# Patient Record
Sex: FEMALE | Race: WHITE | URBAN - METROPOLITAN AREA
[De-identification: names, ages, dates, MRNs, and addresses within clinical notes are randomized per-mention and may not be internally consistent; named-entity substitution may affect disease eponyms.]

---

## 2020-05-08 ENCOUNTER — APPOINTMENT (OUTPATIENT)
Dept: URBAN - METROPOLITAN AREA CLINIC 186 | Age: 70
Setting detail: DERMATOLOGY
End: 2020-05-08

## 2020-05-08 VITALS — TEMPERATURE: 96.9 F

## 2020-05-08 DIAGNOSIS — R21 RASH AND OTHER NONSPECIFIC SKIN ERUPTION: ICD-10-CM

## 2020-05-08 DIAGNOSIS — T49.0X5 ADVERSE EFFECT OF LOCAL ANTIFUNGAL, ANTI-INFECTIVE AND ANTI-INFLAMMATORY DRUGS: ICD-10-CM

## 2020-05-08 PROBLEM — T49.0X5A ADVERSE EFFECT OF LOCAL ANTIFUNGAL, ANTI-INFECTIVE AND ANTI-INFLAMMATORY DRUGS, INITIAL ENCOUNTER: Status: ACTIVE | Noted: 2020-05-08

## 2020-05-08 PROCEDURE — 11105 PUNCH BX SKIN EA SEP/ADDL: CPT

## 2020-05-08 PROCEDURE — OTHER BIOPSY BY PUNCH METHOD: OTHER

## 2020-05-08 PROCEDURE — OTHER COUNSELING: TOPICAL STEROIDS: OTHER

## 2020-05-08 PROCEDURE — OTHER ADDITIONAL NOTES: OTHER

## 2020-05-08 PROCEDURE — 11104 PUNCH BX SKIN SINGLE LESION: CPT

## 2020-05-08 PROCEDURE — OTHER COUNSELING: OTHER

## 2020-05-08 PROCEDURE — OTHER DIAGNOSIS COMMENT: OTHER

## 2020-05-08 PROCEDURE — OTHER PRESCRIPTION: OTHER

## 2020-05-08 PROCEDURE — OTHER ORDER TESTS: OTHER

## 2020-05-08 PROCEDURE — 99202 OFFICE O/P NEW SF 15 MIN: CPT | Mod: 25

## 2020-05-08 ASSESSMENT — LOCATION SIMPLE DESCRIPTION DERM
LOCATION SIMPLE: LEFT POSTERIOR UPPER ARM
LOCATION SIMPLE: LEFT THIGH
LOCATION SIMPLE: RIGHT FOREARM
LOCATION SIMPLE: LEFT FOREARM

## 2020-05-08 ASSESSMENT — LOCATION ZONE DERM
LOCATION ZONE: ARM
LOCATION ZONE: LEG

## 2020-05-08 ASSESSMENT — PAIN INTENSITY VAS: HOW INTENSE IS YOUR PAIN 0 BEING NO PAIN, 10 BEING THE MOST SEVERE PAIN POSSIBLE?: NO PAIN

## 2020-05-08 ASSESSMENT — LOCATION DETAILED DESCRIPTION DERM
LOCATION DETAILED: LEFT ANTERIOR PROXIMAL THIGH
LOCATION DETAILED: LEFT PROXIMAL DORSAL FOREARM
LOCATION DETAILED: RIGHT PROXIMAL DORSAL FOREARM
LOCATION DETAILED: LEFT PROXIMAL LATERAL POSTERIOR UPPER ARM

## 2020-05-08 ASSESSMENT — SEVERITY ASSESSMENT: SEVERITY: MODERATE

## 2020-05-08 NOTE — PROCEDURE: ADDITIONAL NOTES
Detail Level: Simple
Additional Notes: Discussed punch biopsy in area that hasn’t used steroid cream.\\n\\nDiscussed can not use steroid for continuous use.

## 2020-05-08 NOTE — HPI: ITCHING
What Type Of Note Output Would You Prefer (Optional)?: Bullet Format
How Did Your Itching Occur?: gradual in onset  (over a period of years)
How Severe Is Your Itching?: severe
Additional History: Patient states the medication isn’t helping that her primary care physician prescribed

## 2020-05-08 NOTE — PROCEDURE: BIOPSY BY PUNCH METHOD
Home Suture Removal Text: Patient was provided a home suture removal kit and will remove their sutures at home.  If they have any questions or difficulties they will call the office.
Billing Type: Third-Party Bill
Validate Note Data (See Information Below): Yes
Validate Triangulation: No
X Depth Of Punch In Cm (Optional): 0
Wound Care: Petrolatum
Consent: Written consent was obtained and risks were reviewed including but not limited to scarring, infection, bleeding, scabbing, incomplete removal, nerve damage and allergy to anesthesia.
Anesthesia Volume In Cc (Will Not Render If 0): 1
Information: Selecting Yes will display possible errors in your note based on the variables you have selected. This validation is only offered as a suggestion for you. PLEASE NOTE THAT THE VALIDATION TEXT WILL BE REMOVED WHEN YOU FINALIZE YOUR NOTE. IF YOU WANT TO FAX A PRELIMINARY NOTE YOU WILL NEED TO TOGGLE THIS TO 'NO' IF YOU DO NOT WANT IT IN YOUR FAXED NOTE.
Punch Size In Mm: 4
Dressing: no dressing applied
Detail Level: Detailed
Epidermal Sutures: none, closed by secondary intention
Punch Size In Mm: 3
Anesthesia Type: 1% lidocaine with 1:100,000 epinephrine and a 1:10 solution of 8.4% sodium bicarbonate
Post-Care Instructions: I reviewed with the patient in detail post-care instructions. Patient is to keep the biopsy site dry overnight, and then apply bacitracin twice daily until healed. Patient may apply hydrogen peroxide soaks to remove any crusting.
Biopsy Type: H and E
Notification Instructions: Patient will be notified of biopsy results. However, patient instructed to call the office if not contacted within 2 weeks.
Hemostasis: None

## 2020-05-08 NOTE — PROCEDURE: DIAGNOSIS COMMENT
Comment: Discussed any possible medication changes, patient denies any changes
Detail Level: Detailed
Comment: widespread pruritic dermatitis in patient with long standing seasonal dry skin unresponsive to triamcinolone cream.  She has signs of epidermal atrophy suggestive of steroid tolerance.  I suggested oral prednisone, and biopsies to rule out hypersensitivity reaction, drug rash, or early bullous pemphigoid.

## 2020-05-13 ENCOUNTER — RX ONLY (RX ONLY)
Age: 70
End: 2020-05-13

## 2020-05-13 RX ORDER — TACROLIMUS 1 MG/G
OINTMENT TOPICAL
Qty: 1 | Refills: 2 | Status: ERX | COMMUNITY
Start: 2020-05-13

## 2020-06-05 ENCOUNTER — APPOINTMENT (OUTPATIENT)
Dept: URBAN - METROPOLITAN AREA CLINIC 186 | Age: 70
Setting detail: DERMATOLOGY
End: 2020-06-05

## 2020-06-05 VITALS — TEMPERATURE: 96.4 F

## 2020-06-05 DIAGNOSIS — T88.7XX: ICD-10-CM

## 2020-06-05 PROBLEM — T88.7XXA UNSPECIFIED ADVERSE EFFECT OF DRUG OR MEDICAMENT, INITIAL ENCOUNTER: Status: ACTIVE | Noted: 2020-06-05

## 2020-06-05 PROCEDURE — OTHER PATHOLOGY DISCUSSION: OTHER

## 2020-06-05 PROCEDURE — OTHER PRESCRIPTION: OTHER

## 2020-06-05 PROCEDURE — OTHER MIPS QUALITY: OTHER

## 2020-06-05 PROCEDURE — OTHER TREATMENT REGIMEN: OTHER

## 2020-06-05 PROCEDURE — OTHER DIAGNOSIS COMMENT: OTHER

## 2020-06-05 PROCEDURE — 99213 OFFICE O/P EST LOW 20 MIN: CPT

## 2020-06-05 PROCEDURE — OTHER COUNSELING: OTHER

## 2020-06-05 RX ORDER — CLOBETASOL PROPIONATE 0.5 MG/ML
SOLUTION TOPICAL
Qty: 1 | Refills: 2 | Status: ERX | COMMUNITY
Start: 2020-06-05

## 2020-06-05 ASSESSMENT — LOCATION DETAILED DESCRIPTION DERM
LOCATION DETAILED: RIGHT LATERAL SUPERIOR CHEST
LOCATION DETAILED: LEFT PROXIMAL DORSAL FOREARM
LOCATION DETAILED: LEFT LATERAL SUPERIOR CHEST
LOCATION DETAILED: RIGHT PROXIMAL DORSAL FOREARM

## 2020-06-05 ASSESSMENT — LOCATION SIMPLE DESCRIPTION DERM
LOCATION SIMPLE: CHEST
LOCATION SIMPLE: LEFT FOREARM
LOCATION SIMPLE: RIGHT FOREARM

## 2020-06-05 ASSESSMENT — LOCATION ZONE DERM
LOCATION ZONE: TRUNK
LOCATION ZONE: ARM

## 2020-06-05 NOTE — PROCEDURE: DIAGNOSIS COMMENT
Comment: Patient notes improvement after prednisone, however itching has returned 8/10.  She requests additional prednisone. I suggested she call Dr Jonas to discontinue lisinopril/HCTZ a minimum of 6 weeks as I suspect this is causing her rashes.\\nIdeally avoid any diuretics, and choose an anti hypertensive in another drug class. \\nI suggested minimizing topical steroid creams due to potential steroid tolerance and thinning of her skin. \\nShe was advsied to discontinue Lisinopril/HCTZ x 6 weeks\\nRevisit in 6 weeks.
Detail Level: Detailed

## 2020-06-05 NOTE — PROCEDURE: TREATMENT REGIMEN
Initiate Treatment: Tacrolimus twice daily\\nClobetasol/cerave compound twice daily for two weeks
Detail Level: Zone
Plan: Discussed pathology report with patient. Recommended patient discuss lisinopril/HCTZ medication with primary care physician for an alternative. Discussed with patient stopping all unnecessary supplements, garlic, niacinamide for 4-6 weeks to wash out the medications. Counseled patient on taking oral prednisone for extended duration. Cautioned patient against taking them due to immunosuppression and current covid-19 pandemic. Advised patient that she would have to stay home and practice strict social distancing measures

## 2021-01-13 RX ORDER — CLOBETASOL PROPIONATE 0.5 MG/ML
SOLUTION TOPICAL
Qty: 1 | Refills: 2 | Status: ERX

## 2023-08-14 ENCOUNTER — APPOINTMENT (OUTPATIENT)
Dept: URBAN - METROPOLITAN AREA CLINIC 186 | Age: 73
Setting detail: DERMATOLOGY
End: 2023-08-14

## 2023-08-14 DIAGNOSIS — L29.8 OTHER PRURITUS: ICD-10-CM

## 2023-08-14 DIAGNOSIS — L57.8 OTHER SKIN CHANGES DUE TO CHRONIC EXPOSURE TO NONIONIZING RADIATION: ICD-10-CM

## 2023-08-14 PROCEDURE — OTHER PRESCRIPTION: OTHER

## 2023-08-14 PROCEDURE — OTHER COUNSELING: OTHER

## 2023-08-14 PROCEDURE — OTHER SUNSCREEN RECOMMENDATIONS: OTHER

## 2023-08-14 PROCEDURE — OTHER REASSURANCE: OTHER

## 2023-08-14 PROCEDURE — OTHER PRESCRIPTION MEDICATION MANAGEMENT: OTHER

## 2023-08-14 PROCEDURE — 99204 OFFICE O/P NEW MOD 45 MIN: CPT

## 2023-08-14 RX ORDER — CLOBETASOL PROPIONATE 0.5 MG/ML
SOLUTION TOPICAL
Qty: 50 | Refills: 1 | Status: ERX | COMMUNITY
Start: 2023-08-14

## 2023-08-14 ASSESSMENT — LOCATION DETAILED DESCRIPTION DERM
LOCATION DETAILED: MIDDLE STERNUM
LOCATION DETAILED: SUPERIOR LUMBAR SPINE

## 2023-08-14 ASSESSMENT — LOCATION SIMPLE DESCRIPTION DERM
LOCATION SIMPLE: LOWER BACK
LOCATION SIMPLE: CHEST

## 2023-08-14 ASSESSMENT — LOCATION ZONE DERM: LOCATION ZONE: TRUNK

## 2023-08-14 ASSESSMENT — ITCH INTENSITY: HOW SEVERE IS YOUR ITCHING?: 8

## 2023-08-14 NOTE — PROCEDURE: COUNSELING
Detail Level: Simple
Detail Level: Detailed
Patient Specific Counseling (Will Not Stick From Patient To Patient): I discussed with the patient that this might be neuropathic or nerve root pain from back arthritis or entrapment because I don't see any skin findings which might explain a cause.  If topical therapy ineffective, we would discuss systemic medication and I encouraged her to call the specialist recommended for the back problem as soon as she is able.

## 2023-08-14 NOTE — PROCEDURE: PRESCRIPTION MEDICATION MANAGEMENT
Detail Level: Zone
Initiate Treatment: Clobetasol solution with 1 container campho phenique in cerave itch relief 1lb jar moisturizing cream apply twice daily as directed
Render In Strict Bullet Format?: No

## 2023-08-14 NOTE — HPI: ITCHING
What Type Of Note Output Would You Prefer (Optional)?: Bullet Format
On A Scale Of 0 To 10 How Would You Rate Your Itching?: 9
How Did Your Itching Occur?: sudden in onset (over a period of weeks to a few months)
How Severe Is Your Itching?: moderate
Additional History: She is not on a daily antihistamine

## 2023-08-14 NOTE — PROCEDURE: SUNSCREEN RECOMMENDATIONS
General Sunscreen Counseling: I recommended tinted mineral based sunblock with a SPF of 30 or higher. Mineral based sunblock contains zinc and or titanium, no chemicals.  The tinted version helps to block visible light which triggers pigmentation ie melasma. I explained that SPF 30 sunscreens block approximately 97 percent of the sun's harmful rays.  Sunscreens should be applied at least 15 minutes prior to expected sun exposure and then every 2 hours after that as long as sun exposure continues. If swimming or exercising sunscreen should be reapplied every 45 minutes to an hour after getting wet or sweating.  One ounce, or the equivalent of a shot glass full of sunscreen, is adequate to protect the skin not covered by a bathing suit. I also recommended a lip balm with a sunscreen as well. Sun protective clothing can be used in lieu of sunscreen but must be worn the entire time you are exposed to the sun's rays.
Detail Level: Detailed
Products Recommended: Elta MD, Cerave and La Roche-Posay are popular brands.  You may purchase Elta MD from our office.